# Patient Record
Sex: MALE | Race: BLACK OR AFRICAN AMERICAN | NOT HISPANIC OR LATINO | Employment: UNEMPLOYED | URBAN - METROPOLITAN AREA
[De-identification: names, ages, dates, MRNs, and addresses within clinical notes are randomized per-mention and may not be internally consistent; named-entity substitution may affect disease eponyms.]

---

## 2017-12-20 ENCOUNTER — OFFICE VISIT (OUTPATIENT)
Dept: OTOLARYNGOLOGY | Facility: CLINIC | Age: 52
End: 2017-12-20
Payer: OTHER GOVERNMENT

## 2017-12-20 ENCOUNTER — CLINICAL SUPPORT (OUTPATIENT)
Dept: AUDIOLOGY | Facility: CLINIC | Age: 52
End: 2017-12-20
Payer: OTHER GOVERNMENT

## 2017-12-20 VITALS — HEIGHT: 71 IN | BODY MASS INDEX: 30.83 KG/M2 | WEIGHT: 220.25 LBS

## 2017-12-20 DIAGNOSIS — H60.8X3 CHRONIC ECZEMATOUS OTITIS EXTERNA OF BOTH EARS: ICD-10-CM

## 2017-12-20 DIAGNOSIS — H90.11 CONDUCTIVE HEARING LOSS OF RIGHT EAR WITH UNRESTRICTED HEARING OF LEFT EAR: ICD-10-CM

## 2017-12-20 DIAGNOSIS — H80.91 OTOSCLEROSIS OF RIGHT EAR: Primary | ICD-10-CM

## 2017-12-20 DIAGNOSIS — H90.11 CONDUCTIVE HEARING LOSS OF RIGHT EAR WITH UNRESTRICTED HEARING OF LEFT EAR: Primary | ICD-10-CM

## 2017-12-20 PROCEDURE — 92567 TYMPANOMETRY: CPT | Mod: PBBFAC | Performed by: AUDIOLOGIST

## 2017-12-20 PROCEDURE — 92557 COMPREHENSIVE HEARING TEST: CPT | Mod: PBBFAC | Performed by: AUDIOLOGIST

## 2017-12-20 PROCEDURE — 99204 OFFICE O/P NEW MOD 45 MIN: CPT | Mod: S$PBB,,, | Performed by: OTOLARYNGOLOGY

## 2017-12-20 PROCEDURE — 99999 PR PBB SHADOW E&M-EST. PATIENT-LVL II: CPT | Mod: PBBFAC,,, | Performed by: OTOLARYNGOLOGY

## 2017-12-20 PROCEDURE — 99212 OFFICE O/P EST SF 10 MIN: CPT | Mod: PBBFAC | Performed by: OTOLARYNGOLOGY

## 2017-12-20 RX ORDER — BETAMETHASONE VALERATE 1.2 MG/G
CREAM TOPICAL DAILY PRN
Qty: 45 G | Refills: 0 | Status: SHIPPED | OUTPATIENT
Start: 2017-12-20 | End: 2017-12-30

## 2017-12-20 NOTE — LETTER
December 20, 2017      [facility]  [address] [facility] [address]           [facility] - Otorhinolaryngology  [address] [facility] [address]  Phone: [phone]  Fax: [phone]          Patient: Darrion Barton   MR Number: [id number]   YOB: 1965   Date of Visit: 12/20/2017       Dear [facility]:    Thank you for referring Darrion Barton to me for evaluation. Attached you will find relevant portions of my assessment and plan of care.    If you have questions, please do not hesitate to call me. I look forward to following Darrion Barton along with you.    Sincerely,    Mohamud Real MD    Enclosure  CC:  No Recipients    If you would like to receive this communication electronically, please contact [email or web address] or [phone] to request more information on [employer] access.    For providers and/or their staff who would like to refer a patient to [facility], please contact us through our one-stop-shop provider referral line, [facility], at [phone].    If you feel you have received this communication in error or would no longer like to receive these types of communications, please e-mail [email or web address]

## 2017-12-20 NOTE — PROGRESS NOTES
Subjective:       Patient ID: Darrion Barton is a 52 y.o. male.    Chief Complaint: hearing loss Right    HPI   51 y/o M presents for eval right hearing loss. This has been present for approx 10 years. He reports tinnitus in the right ear. He does have noise exposure when in the  but denies trauma or ear surgery. He reports several ear infections over the past few years treated with antibiotics. He has reports occasionally waking up with drainage from his ears R>L. He reports ear itchiness at times. Denies dizziness or significant vertigo.     Review of Systems    Consitutional: no fevers, chills, weight loss  Eyes: no visual changes, diplopia  ENT: no dysphagia, odynophagia, hoarseness, otalgia, epistaxis, nasal obstruction  CV: no chest pain  Resp: no SOB, hemoptysis   GI: no abd pain, N/V  : no dysuria  Neuro: no focal weakness  Endo: No hot/cold intolerance  Msk: No joint pain or swelling     Past Medical History: he  has a past medical history of Hearing loss.    Past Surgical History: he  has a past surgical history that includes Back surgery.    Social History: he  reports that he has quit smoking. He does not have any smokeless tobacco history on file. He reports that he does not drink alcohol.    Family History: family history is not on file.    Medications:     Current Outpatient Prescriptions:     GABAPENTIN ORAL, Take by mouth., Disp: , Rfl:     betamethasone valerate 0.1% (VALISONE) 0.1 % Crea, Apply topically daily as needed. For itching or drainage, Disp: 45 g, Rfl: 0    Allergies: he has No Known Allergies.    Objective:      Physical Exam   Constitutional: He appears well-developed and well-nourished. No distress.   HENT:   Head: Normocephalic and atraumatic. Not macrocephalic and not microcephalic.   Right Ear: Tympanic membrane and external ear normal. No drainage, swelling or tenderness. No middle ear effusion. Decreased hearing is noted.   Left Ear: Tympanic membrane and  external ear normal. No drainage, swelling or tenderness.  No middle ear effusion. No decreased hearing is noted.   Ears:    Nose: Nose normal. Right sinus exhibits no maxillary sinus tenderness and no frontal sinus tenderness. Left sinus exhibits no maxillary sinus tenderness and no frontal sinus tenderness.   Mouth/Throat: Uvula is midline, oropharynx is clear and moist and mucous membranes are normal.   Eyes: Conjunctivae, EOM and lids are normal.   Neck: Trachea normal, normal range of motion, full passive range of motion without pain and phonation normal. Neck supple. No thyroid mass and no thyromegaly present.   Skin: He is not diaphoretic.           Rinne Neg AD with 512 hz.    Harris to R.      CT neg by report.      Assessment:       1. Otosclerosis of right ear    2. Conductive hearing loss of right ear with unrestricted hearing of left ear    3. Chronic eczematous otitis externa of both ears        Plan:       Juana  Discussed risks, benefits and alternatives of right stapedectomy; pt will think about this. He will schedule at his convenience.         Discussed Right small fenestra stapedotomy with patient. Also reviewed all other options including observation and amplification. Risks, complications, benefits and goals reviewed. This includes: failure to improve, total loss of hearing, tinnitus, dizziness, chorda symptoms, infection, bleeding, need for revision and other potential complications. Will proceed at the patients convinience a general outpatient procedure after appropriate clearances.